# Patient Record
Sex: FEMALE | Race: WHITE | NOT HISPANIC OR LATINO | Employment: FULL TIME | URBAN - METROPOLITAN AREA
[De-identification: names, ages, dates, MRNs, and addresses within clinical notes are randomized per-mention and may not be internally consistent; named-entity substitution may affect disease eponyms.]

---

## 2017-03-20 ENCOUNTER — HOSPITAL ENCOUNTER (EMERGENCY)
Facility: MEDICAL CENTER | Age: 22
End: 2017-03-20
Attending: EMERGENCY MEDICINE
Payer: MEDICAID

## 2017-03-20 ENCOUNTER — APPOINTMENT (OUTPATIENT)
Dept: RADIOLOGY | Facility: MEDICAL CENTER | Age: 22
End: 2017-03-20
Attending: EMERGENCY MEDICINE
Payer: MEDICAID

## 2017-03-20 VITALS
DIASTOLIC BLOOD PRESSURE: 71 MMHG | BODY MASS INDEX: 24.24 KG/M2 | RESPIRATION RATE: 16 BRPM | TEMPERATURE: 98.1 F | HEIGHT: 60 IN | WEIGHT: 123.46 LBS | SYSTOLIC BLOOD PRESSURE: 105 MMHG | OXYGEN SATURATION: 96 % | HEART RATE: 68 BPM

## 2017-03-20 DIAGNOSIS — N93.9 VAGINA BLEEDING: Chronic | ICD-10-CM

## 2017-03-20 DIAGNOSIS — R42 DIZZINESS: ICD-10-CM

## 2017-03-20 DIAGNOSIS — Z91.199 NONCOMPLIANCE WITH TREATMENT PLAN: ICD-10-CM

## 2017-03-20 LAB
ALBUMIN SERPL BCP-MCNC: 5 G/DL (ref 3.2–4.9)
ALBUMIN/GLOB SERPL: 1.9 G/DL
ALP SERPL-CCNC: 66 U/L (ref 30–99)
ALT SERPL-CCNC: 10 U/L (ref 2–50)
ANION GAP SERPL CALC-SCNC: 7 MMOL/L (ref 0–11.9)
APPEARANCE UR: CLEAR
AST SERPL-CCNC: 16 U/L (ref 12–45)
BASOPHILS # BLD AUTO: 0.5 % (ref 0–1.8)
BASOPHILS # BLD: 0.04 K/UL (ref 0–0.12)
BILIRUB SERPL-MCNC: 0.4 MG/DL (ref 0.1–1.5)
BUN SERPL-MCNC: 7 MG/DL (ref 8–22)
CALCIUM SERPL-MCNC: 9.9 MG/DL (ref 8.5–10.5)
CHLORIDE SERPL-SCNC: 106 MMOL/L (ref 96–112)
CO2 SERPL-SCNC: 26 MMOL/L (ref 20–33)
COLOR UR AUTO: YELLOW
CREAT SERPL-MCNC: 0.88 MG/DL (ref 0.5–1.4)
EOSINOPHIL # BLD AUTO: 0.22 K/UL (ref 0–0.51)
EOSINOPHIL NFR BLD: 2.9 % (ref 0–6.9)
ERYTHROCYTE [DISTWIDTH] IN BLOOD BY AUTOMATED COUNT: 39.8 FL (ref 35.9–50)
GFR SERPL CREATININE-BSD FRML MDRD: >60 ML/MIN/1.73 M 2
GLOBULIN SER CALC-MCNC: 2.6 G/DL (ref 1.9–3.5)
GLUCOSE SERPL-MCNC: 63 MG/DL (ref 65–99)
GLUCOSE UR QL STRIP.AUTO: NEGATIVE MG/DL
HCG SERPL QL: NEGATIVE
HCG UR QL: NEGATIVE
HCT VFR BLD AUTO: 49 % (ref 37–47)
HGB BLD-MCNC: 16.2 G/DL (ref 12–16)
IMM GRANULOCYTES # BLD AUTO: 0.02 K/UL (ref 0–0.11)
IMM GRANULOCYTES NFR BLD AUTO: 0.3 % (ref 0–0.9)
KETONES UR QL STRIP.AUTO: NEGATIVE MG/DL
LEUKOCYTE ESTERASE UR QL STRIP.AUTO: ABNORMAL
LYMPHOCYTES # BLD AUTO: 2.93 K/UL (ref 1–4.8)
LYMPHOCYTES NFR BLD: 38.5 % (ref 22–41)
MCH RBC QN AUTO: 28.5 PG (ref 27–33)
MCHC RBC AUTO-ENTMCNC: 33.1 G/DL (ref 33.6–35)
MCV RBC AUTO: 86.3 FL (ref 81.4–97.8)
MONOCYTES # BLD AUTO: 0.45 K/UL (ref 0–0.85)
MONOCYTES NFR BLD AUTO: 5.9 % (ref 0–13.4)
NEUTROPHILS # BLD AUTO: 3.95 K/UL (ref 2–7.15)
NEUTROPHILS NFR BLD: 51.9 % (ref 44–72)
NITRITE UR QL STRIP.AUTO: NEGATIVE
NRBC # BLD AUTO: 0 K/UL
NRBC BLD AUTO-RTO: 0 /100 WBC
PH UR STRIP.AUTO: 6 [PH]
PLATELET # BLD AUTO: 280 K/UL (ref 164–446)
PMV BLD AUTO: 11 FL (ref 9–12.9)
POTASSIUM SERPL-SCNC: 3.6 MMOL/L (ref 3.6–5.5)
PROT SERPL-MCNC: 7.6 G/DL (ref 6–8.2)
PROT UR QL STRIP: NEGATIVE MG/DL
RBC # BLD AUTO: 5.68 M/UL (ref 4.2–5.4)
RBC UR QL AUTO: ABNORMAL
SODIUM SERPL-SCNC: 139 MMOL/L (ref 135–145)
SP GR UR: 1.01
WBC # BLD AUTO: 7.6 K/UL (ref 4.8–10.8)

## 2017-03-20 PROCEDURE — 84703 CHORIONIC GONADOTROPIN ASSAY: CPT | Mod: EDC

## 2017-03-20 PROCEDURE — 99284 EMERGENCY DEPT VISIT MOD MDM: CPT | Mod: EDC

## 2017-03-20 PROCEDURE — 81002 URINALYSIS NONAUTO W/O SCOPE: CPT | Mod: EDC

## 2017-03-20 PROCEDURE — 76830 TRANSVAGINAL US NON-OB: CPT

## 2017-03-20 PROCEDURE — 81025 URINE PREGNANCY TEST: CPT | Mod: EDC

## 2017-03-20 PROCEDURE — 85025 COMPLETE CBC W/AUTO DIFF WBC: CPT | Mod: EDC

## 2017-03-20 PROCEDURE — 36415 COLL VENOUS BLD VENIPUNCTURE: CPT | Mod: EDC

## 2017-03-20 PROCEDURE — 80053 COMPREHEN METABOLIC PANEL: CPT | Mod: EDC

## 2017-03-20 ASSESSMENT — PAIN SCALES - GENERAL
PAINLEVEL_OUTOF10: 8
PAINLEVEL_OUTOF10: 6
PAINLEVEL_OUTOF10: 3

## 2017-03-20 NOTE — ED AVS SNAPSHOT
3/20/2017          Mounika Brennan  [address] [place] [id number]    Dear Mounika:    [facility] the [facility] wants to ensure your discharge home is safe and you or your loved ones have had all your questions answered regarding your care after you leave the hospital.    You may receive a telephone call within two days of your discharge.  This call is to make certain you understand your discharge instructions as well as ensure we provided you with the best care possible during your stay with us.     The call will only last approximately 3-5 minutes and will be done by a nurse.    Once again, we want to ensure your discharge home is safe and that you have a clear understanding of any next steps in your care.  If you have any questions or concerns, please do not hesitate to contact us, we are here for you.  Thank you for choosing [facility] for your healthcare needs.    Sincerely,    Narendra Alves    [facility]

## 2017-03-20 NOTE — ED NOTES
Amb to triage w/ c/o vaginal bleeding, abd cramping & dizziness.  Pt reports symptoms x 7 months after having a vaginal birth.  States dark blood.  8-10 tampons per day.  PWD.

## 2017-03-20 NOTE — ED PROVIDER NOTES
ED Provider Note    Scribed for Emely Beckford M.D. by John Jain. 3/20/2017, 3:45 PM.    Primary care provider: None  Means of arrival: Walk-in  History obtained from: Patient  History limited by: None    CHIEF COMPLAINT  Chief Complaint   Patient presents with   • Vaginal Bleeding   • Dizziness       HPI  Mounika Brennan is a 21 y.o. female who presents to the Emergency Department with vaginal bleeding onset 7 months ago. The patient had her first vaginal delivery of her son 7 months ago and states she began developing vaginal bleeding immediately since deliver. She describes the bleeding as dark red in color with intermittent clots and has been using 10-15 tampons daily that are soaked when changing them. Patient states she began developing intermittent abdominal cramping, loss of appetite, diarrhea with blood present, nausea, and vomiting 2 months later and has been medicating with Ibuprofen and Advil with minimal relief. Patient states that symptoms occasionally keep her up at night and will wake her up from sleep. She notes that having sexual intercourse will slow down the vaginal bleeding, to where she uses 7 tampons, but usually lasts for 2 days before she experiences heavy bleeding again. Patient reports dizziness and bilateral lower extremity numbness and tingling lasting approximately 30 minutes that occurs 3-4 times weekly since the vaginal bleeding began, but denies any tingling at this time. Since the delivery, she reports that she is now able to smell her body odor and notes weight loss of approximately 10 lbs. The patient was seen in a Hospital in [place] for symptoms, where she initially lived, and was advised she follow up with an OB/GYN. However, she states she was unable to find an OB/GYN and moved to [place] 1.5 months ago without established PCP or OB/GYN due to insurance complications. She denies any vaginal discharge, vision changes, syncope, diarrhea, or fever.  The patient reports no  complications during her pregnancy, but states she developed an internal laceration when delivering her baby and had 1 stitch placed. She notes her placenta was removed and did not have any ultrasounds performed after he delivery. She initially breast fed her son for 13 days after birth, but is now formula feeding her son due to nipple complications. The patient states she began having normal menses at the age of 11 years old and began having intercourse at the age of 13. Her last pap smear was last year, which was negative. The patient has no family history of bleeding disorders, ovarian cancer, or endometriosis. 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 She does not take any current medications and is not taking any contraceptives. She was initially on Depo vera after her delivery, but it wore off in December 2016. The patient smokes a pack daily since age 13 and states she smoked throughout the entire pregnancy. No history of asthma or diabetes. She has no allergy to medications.    REVIEW OF SYSTEMS  Pertinent positives include vaginal bleeding, intermittent abdominal cramping, loss of appetite, diarrhea with blood present, nausea, vomiting, dizziness, bilateral lower extremity numbness and tingling, increased body odor, and weight loss. Pertinent negatives include no vaginal discharge, vision changes, syncope, diarrhea, or fever. As above, all other systems reviewed and are negative. C.   See HPI for further details.     PAST MEDICAL HISTORY  3 kidneys.     SURGICAL HISTORY  Kidney surgery.     SOCIAL HISTORY  Social History   Substance Use Topics   • Smoking status: Current Every Day Smoker- 1pk/day     Types: Cigarettes   • Smokeless tobacco: None   • Alcohol Use: No      History   Drug Use No     FAMILY HISTORY  History reviewed. No pertinent family history.    CURRENT MEDICATIONS  Home Medications     Reviewed by Dung Lee R.N.  (Registered Nurse) on 03/20/17 at 1408  Med List Status: Not Addressed    Medication Last Dose Status          Patient Zac Taking any Medications                        ALLERGIES  No Known Allergies    PHYSICAL EXAM  VITAL SIGNS: /66 mmHg  Pulse 65  Temp(Src) 36.4 °C (97.5 °F) (Temporal)  Resp 16  Ht 1.524 m (5')  Wt 56 kg (123 lb 7.3 oz)  BMI 24.11 kg/m2  SpO2 98%  Vitals reviewed.    Consitutional: Well-developed, well-nourished. Negative for: distress.  HENT: Normocephalic, right external ear normal, left external ear normal, oropharynx clear and moist.  Eyes: Conjunctivae normal, extraocular movements normal. Normal pallor of eyelids. Negative for: discharge in right and left eye, icterus.  Neck: Range of motion normal, supple. Negative for cervical adenopathy.  Cardiovascular: Normal rate, regular rhythm, heart sounds normal, intact distal pulses. Negative for: murmur, rub, gallop. Normal capillary refill.   Pulmonary/Chest Wall: Effort normal, breath sounds normal. Negative for: respiratory distress, wheezes, rales, rhonchi.   Abdominal: Soft, bowel sounds normal. tender to palpation in bilateral lower quadrants down to pelvis and hips, Negative for: distention, rebound, guarding.  Musculoskeletal: Normal range of motion. Negative for edema.Normal pedal pulses.   Neurological: Alert and oriented x3. No focal deficits. Normal gait without ataxia  Skin: Warm, dry. Negative for rash.  Psych: Mood/affect normal, behavior normal, judgment normal.    DIAGNOSTIC STUDIES / PROCEDURES    LABS  Results for orders placed or performed during the hospital encounter of 03/20/17   CBC WITH DIFFERENTIAL   Result Value Ref Range    WBC 7.6 4.8 - 10.8 K/uL    RBC 5.68 (H) 4.20 - 5.40 M/uL    Hemoglobin 16.2 (H) 12.0 - 16.0 g/dL    Hematocrit 49.0 (H) 37.0 - 47.0 %    MCV 86.3 81.4 - 97.8 fL    MCH 28.5 27.0 - 33.0 pg    MCHC 33.1 (L) 33.6 - 35.0 g/dL    RDW 39.8 35.9 - 50.0 fL    Platelet Count 280 164 - 446 K/uL     MPV 11.0 9.0 - 12.9 fL    Neutrophils-Polys 51.90 44.00 - 72.00 %    Lymphocytes 38.50 22.00 - 41.00 %    Monocytes 5.90 0.00 - 13.40 %    Eosinophils 2.90 0.00 - 6.90 %    Basophils 0.50 0.00 - 1.80 %    Immature Granulocytes 0.30 0.00 - 0.90 %    Nucleated RBC 0.00 /100 WBC    Neutrophils (Absolute) 3.95 2.00 - 7.15 K/uL    Lymphs (Absolute) 2.93 1.00 - 4.80 K/uL    Monos (Absolute) 0.45 0.00 - 0.85 K/uL    Eos (Absolute) 0.22 0.00 - 0.51 K/uL    Baso (Absolute) 0.04 0.00 - 0.12 K/uL    Immature Granulocytes (abs) 0.02 0.00 - 0.11 K/uL    NRBC (Absolute) 0.00 K/uL   COMP METABOLIC PANEL   Result Value Ref Range    Sodium 139 135 - 145 mmol/L    Potassium 3.6 3.6 - 5.5 mmol/L    Chloride 106 96 - 112 mmol/L    Co2 26 20 - 33 mmol/L    Anion Gap 7.0 0.0 - 11.9    Glucose 63 (L) 65 - 99 mg/dL    Bun 7 (L) 8 - 22 mg/dL    Creatinine 0.88 0.50 - 1.40 mg/dL    Calcium 9.9 8.5 - 10.5 mg/dL    AST(SGOT) 16 12 - 45 U/L    ALT(SGPT) 10 2 - 50 U/L    Alkaline Phosphatase 66 30 - 99 U/L    Total Bilirubin 0.4 0.1 - 1.5 mg/dL    Albumin 5.0 (H) 3.2 - 4.9 g/dL    Total Protein 7.6 6.0 - 8.2 g/dL    Globulin 2.6 1.9 - 3.5 g/dL    A-G Ratio 1.9 g/dL   HCG QUAL SERUM   Result Value Ref Range    Beta-Hcg Qualitative Serum Negative Negative   ESTIMATED GFR   Result Value Ref Range    GFR If African American >60 >60 mL/min/1.73 m 2    GFR If Non African American >60 >60 mL/min/1.73 m 2   POC UA   Result Value Ref Range    POC Color Yellow     POC Appearance Clear     POC Glucose Negative Negative mg/dL    POC Ketones Negative Negative mg/dL    POC Specific Gravity 1.010 1.005-1.030    POC Blood Trace-lysed (A) Negative    POC Urine PH 6.0 5.0-8.0    POC Protein Negative Negative mg/dL    POC Nitrites Negative Negative    POC Leukocyte Esterase Trace (A) Negative   POC URINE PREGNANCY   Result Value Ref Range    POC Urine Pregnancy Test Negative Negative   All labs reviewed by me.    RADIOLOGY  US-GYN-PELVIS TRANSVAGINAL   Final  Result      Endometrial stripe measuring 1 mm width with no evidence of retained products of conception.      Scattered small echogenic foci throughout the medial cavity likely representing air from recent delivery      The radiologist's interpretation of all radiological studies have been reviewed by me.    COURSE & MEDICAL DECISION MAKING  Nursing notes, VS, PMSFHx reviewed in chart.      3:07 PM Ordered estimated GFR, CMP, CBC, POC urine pregnancy, and HCG qual serum.     4:03 PM Patient seen at bedside by medical student. The patient presents with chronic postpartum vaginal bleeding without OB follow-up and the differential diagnosis includes but is not limited to anemia, retained placenta, endometritis. Ordered US-OB pelvis transvaginal.    6:24 PM I've advised the patient that it is imperative to see a OB for her ongoing postpartum issues. In the meantime, I have assured her that she does not appear to be dehydrated or anemic and is handling whatever blood loss she has well.    The patient will return for new or worsening symptoms and is stable at the time of discharge.    The patient is referred to a primary physician for blood pressure management, diabetic screening, and for all other preventative health concerns.    DISPOSITION:  Patient will be discharged home in stable condition.    FOLLOW UP:  Makenzie Parry M.D.  [address]  [address]  [facility] [address]  [phone]    Call in 1 day  for further evaluation      OUTPATIENT MEDICATIONS:  New Prescriptions    No medications on file           FINAL IMPRESSION  1. Vagina bleeding    2. Dizziness    3. Noncompliance with treatment plan          John CHEN), am scribing for, and in the presence of, Emely Beckford M.D..    Electronically signed by: John Butt), 3/20/2017    Emely CHEN M.D. personally performed the services described in this documentation, as scribed by John Jain in my presence, and it is both accurate and  complete.    The note accurately reflects work and decisions made by me.  Emely Beckford  3/20/2017  6:25 PM

## 2017-03-20 NOTE — ED AVS SNAPSHOT
Home Care Instructions                                                                                                                Mounika Brennan   MRN: [id number]    Department:  [facility], Emergency Dept   Date of Visit:  3/20/2017            [facility], Emergency Dept    [address]    [facility] [address]    Phone:  [phone]      You were seen by     Emely Beckford M.D.      Your Diagnosis Was     Vagina bleeding     N93.9       Follow-up Information     1. Follow up with Makenzie Parry M.D.. Call in 1 day.    Specialty:  OB/Gyn    Why:  for further evaluation    Contact information    [address]  [address]  [facility] [address]  [phone]        Medication Information     Review all of your home medications and newly ordered medications with your primary doctor and/or pharmacist as soon as possible. Follow medication instructions as directed by your doctor and/or pharmacist.     Please keep your complete medication list with you and share with your physician. Update the information when medications are discontinued, doses are changed, or new medications (including over-the-counter products) are added; and carry medication information at all times in the event of emergency situations.               Medication List      Notice     You have not been prescribed any medications.            Procedures and tests performed during your visit     CARDIAC MONITORING    CBC WITH DIFFERENTIAL    COMP METABOLIC PANEL    ESTIMATED GFR    HCG QUAL SERUM    O2 Protocol    POC UA    POC URINE PREGNANCY    SALINE LOCK    US-GYN-PELVIS TRANSVAGINAL        Discharge Instructions       Abnormal Uterine Bleeding  Abnormal uterine bleeding means bleeding from the vagina that is not your normal menstrual period. This can be:  · Bleeding or spotting between periods.  · Bleeding after sex (sexual intercourse).  · Bleeding that is heavier or more than normal.  · Periods that last longer than  usual.  · Bleeding after menopause.  There are many problems that may cause this. Treatment will depend on the cause of the bleeding. Any kind of bleeding that is not normal should be reviewed by your doctor.   HOME CARE  Watch your condition for any changes. These actions may lessen any discomfort you are having:  · Do not use tampons or douches as told by your doctor.  · Change your pads often.  You should get regular pelvic exams and Pap tests. Keep all appointments for tests as told by your doctor.  GET HELP IF:  · You are bleeding for more than 1 week.  · You feel dizzy at times.  GET HELP RIGHT AWAY IF:   · You pass out.  · You have to change pads every 15 to 30 minutes.  · You have belly pain.  · You have a fever.  · You become sweaty or weak.  · You are passing large blood clots from the vagina.  · You feel sick to your stomach (nauseous) and throw up (vomit).  MAKE SURE YOU:  · Understand these instructions.  · Will watch your condition.  · Will get help right away if you are not doing well or get worse.     This information is not intended to replace advice given to you by your health care provider. Make sure you discuss any questions you have with your health care provider.     Document Released: 10/15/2010 Document Revised: 12/23/2014 Document Reviewed: 07/17/2014  [employer]vier Interactive Patient Education ©2016 [email or web address]            Patient Information     Patient Information    Following emergency treatment: all patient requiring follow-up care must return either to a private physician or a clinic if your condition worsens before you are able to obtain further medical attention, please return to the emergency room.     Billing Information    At [facility], we work to make the billing process streamlined for our patients.  Our Representatives are here to answer any questions you may have regarding your hospital bill.  If you have insurance coverage and have supplied your insurance information to us,  we will submit a claim to your insurer on your behalf.  Should you have any questions regarding your bill, we can be reached online or by phone as follows:  Online: You are able pay your bills online or live chat with our representatives about any billing questions you may have. We are here to help Monday - Friday from 8:00am to 7:30pm and 9:00am - 12:00pm on Saturdays.  Please visit [email or web address]  for more information.   Phone:  [phone] or [phone]    Please note that your emergency physician, surgeon, pathologist, radiologist, anesthesiologist, and other specialists are not employed by [facility] and will therefore bill separately for their services.  Please contact them directly for any questions concerning their bills at the numbers below:     Emergency Physician Services:  [phone]  [facility]:  [phone]  [facility]:  [phone]  [facility]:  [phone]    1. Your final bill may vary from the amount quoted upon discharge if all procedures are not complete at that time, or if your doctor has additional procedures of which we are not aware. You will receive an additional bill if you return to the Emergency Department at [facility] for suture removal regardless of the facility of which the sutures were placed.     2. Please arrange for settlement of this account at the emergency registration.    3. All self-pay accounts are due in full at the time of treatment.  If you are unable to meet this obligation then payment is expected within 4-5 days.     4. If you have had radiology studies (CT, X-ray, Ultrasound, MRI), you have received a preliminary result during your emergency department visit. Please contact the radiology department [phone] to receive a copy of your final result. Please discuss the Final result with your primary physician or with the follow up physician provided.     Crisis  Hotline:  National Crisis Hotline:  5-930-LDEJRLE or 1-975.425.5424  [place] Crisis Hotline:    1-991.401.7875 or 168-698-7526         ED Discharge Follow Up Questions    1. In order to provide you with very good care, we would like to follow up with a phone call in the next few days.  May we have your permission to contact you?     YES /  NO    2. What is the best phone number to call you? (       )_____-__________    3. What is the best time to call you?      Morning  /  Afternoon  /  Evening                   Patient Signature:  ____________________________________________________________    Date:  ____________________________________________________________

## 2017-03-20 NOTE — ED AVS SNAPSHOT
Universal Devices Access Code: [email or web address]  Expires: 4/19/2017  6:23 PM    Universal Devices  A secure, online tool to manage your health information     [employer]’s Universal Devices® is a secure, online tool that connects you to your personalized health information from the privacy of your home -- day or night - making it very easy for you to manage your healthcare. Once the activation process is completed, you can even access your medical information using the Universal Devices radha, which is available for free in the Apple Radha store or Google Play store.     Universal Devices provides the following levels of access (as shown below):   My Chart Features   [facility] Primary Care Doctor [facility]  Specialists [facility]  Urgent  Care Non-[facility]  Primary Care  Doctor   Email your healthcare team securely and privately 24/7 X X X X   Manage appointments: schedule your next appointment; view details of past/upcoming appointments X      Request prescription refills. X      View recent personal medical records, including lab and immunizations X X X X   View health record, including health history, allergies, medications X X X X   Read reports about your outpatient visits, procedures, consult and ER notes X X X X   See your discharge summary, which is a recap of your hospital and/or ER visit that includes your diagnosis, lab results, and care plan. X X       How to register for [employer] Devices:  1. Go to  [email or web address].  2. Click on the Sign Up Now box, which takes you to the New Member Sign Up page. You will need to provide the following information:  a. Enter your Universal Devices Access Code exactly as it appears at the top of this page. (You will not need to use this code after you’ve completed the sign-up process. If you do not sign up before the expiration date, you must request a new code.)   b. Enter your date of birth.   c. Enter your home email address.   d. Click Submit, and follow the next screen’s instructions.  3. Create a Universal Devices ID. This will be your Universal Devices  login ID and cannot be changed, so think of one that is secure and easy to remember.  4. Create a [employer] password. You can change your password at any time.  5. Enter your Password Reset Question and Answer. This can be used at a later time if you forget your password.   6. Enter your e-mail address. This allows you to receive e-mail notifications when new information is available in [employer].  7. Click Sign Up. You can now view your health information.    For assistance activating your [employer] account, call [phone]

## 2017-03-21 NOTE — DISCHARGE INSTRUCTIONS
Abnormal Uterine Bleeding  Abnormal uterine bleeding means bleeding from the vagina that is not your normal menstrual period. This can be:  · Bleeding or spotting between periods.  · Bleeding after sex (sexual intercourse).  · Bleeding that is heavier or more than normal.  · Periods that last longer than usual.  · Bleeding after menopause.  There are many problems that may cause this. Treatment will depend on the cause of the bleeding. Any kind of bleeding that is not normal should be reviewed by your doctor.   HOME CARE  Watch your condition for any changes. These actions may lessen any discomfort you are having:  · Do not use tampons or douches as told by your doctor.  · Change your pads often.  You should get regular pelvic exams and Pap tests. Keep all appointments for tests as told by your doctor.  GET HELP IF:  · You are bleeding for more than 1 week.  · You feel dizzy at times.  GET HELP RIGHT AWAY IF:   · You pass out.  · You have to change pads every 15 to 30 minutes.  · You have belly pain.  · You have a fever.  · You become sweaty or weak.  · You are passing large blood clots from the vagina.  · You feel sick to your stomach (nauseous) and throw up (vomit).  MAKE SURE YOU:  · Understand these instructions.  · Will watch your condition.  · Will get help right away if you are not doing well or get worse.     This information is not intended to replace advice given to you by your health care provider. Make sure you discuss any questions you have with your health care provider.     Document Released: 10/15/2010 Document Revised: 12/23/2014 Document Reviewed: 07/17/2014  [employer] Interactive Patient Education ©2016 [employer]

## 2017-03-21 NOTE — ED NOTES
Pt discharged, reviewed all discharge instructions and follow up, denies questions and complaints.  Escorted to lobby.

## 2017-04-27 ENCOUNTER — HOSPITAL ENCOUNTER (EMERGENCY)
Facility: MEDICAL CENTER | Age: 22
End: 2017-04-27
Attending: EMERGENCY MEDICINE
Payer: MEDICAID

## 2017-04-27 VITALS
DIASTOLIC BLOOD PRESSURE: 63 MMHG | BODY MASS INDEX: 23.5 KG/M2 | HEIGHT: 60 IN | OXYGEN SATURATION: 99 % | WEIGHT: 119.71 LBS | RESPIRATION RATE: 18 BRPM | TEMPERATURE: 97.4 F | SYSTOLIC BLOOD PRESSURE: 103 MMHG | HEART RATE: 63 BPM

## 2017-04-27 DIAGNOSIS — N93.8 DUB (DYSFUNCTIONAL UTERINE BLEEDING): ICD-10-CM

## 2017-04-27 DIAGNOSIS — A59.01 TRICHOMONAL VAGINITIS: ICD-10-CM

## 2017-04-27 LAB
ALBUMIN SERPL BCP-MCNC: 4.6 G/DL (ref 3.2–4.9)
ALBUMIN/GLOB SERPL: 2 G/DL
ALP SERPL-CCNC: 70 U/L (ref 30–99)
ALT SERPL-CCNC: 8 U/L (ref 2–50)
ANION GAP SERPL CALC-SCNC: 5 MMOL/L (ref 0–11.9)
AST SERPL-CCNC: 15 U/L (ref 12–45)
BACTERIA GENITAL QL WET PREP: NORMAL
BASOPHILS # BLD AUTO: 0.5 % (ref 0–1.8)
BASOPHILS # BLD: 0.03 K/UL (ref 0–0.12)
BILIRUB SERPL-MCNC: 0.5 MG/DL (ref 0.1–1.5)
BUN SERPL-MCNC: 10 MG/DL (ref 8–22)
C TRACH DNA SPEC QL NAA+PROBE: NEGATIVE
CALCIUM SERPL-MCNC: 9.3 MG/DL (ref 8.5–10.5)
CHLORIDE SERPL-SCNC: 109 MMOL/L (ref 96–112)
CO2 SERPL-SCNC: 23 MMOL/L (ref 20–33)
CREAT SERPL-MCNC: 0.71 MG/DL (ref 0.5–1.4)
EOSINOPHIL # BLD AUTO: 0.17 K/UL (ref 0–0.51)
EOSINOPHIL NFR BLD: 2.9 % (ref 0–6.9)
ERYTHROCYTE [DISTWIDTH] IN BLOOD BY AUTOMATED COUNT: 38.8 FL (ref 35.9–50)
GFR SERPL CREATININE-BSD FRML MDRD: >60 ML/MIN/1.73 M 2
GLOBULIN SER CALC-MCNC: 2.3 G/DL (ref 1.9–3.5)
GLUCOSE SERPL-MCNC: 95 MG/DL (ref 65–99)
HCG SERPL QL: NEGATIVE
HCT VFR BLD AUTO: 44.3 % (ref 37–47)
HGB BLD-MCNC: 15 G/DL (ref 12–16)
IMM GRANULOCYTES # BLD AUTO: 0.02 K/UL (ref 0–0.11)
IMM GRANULOCYTES NFR BLD AUTO: 0.3 % (ref 0–0.9)
LYMPHOCYTES # BLD AUTO: 2.15 K/UL (ref 1–4.8)
LYMPHOCYTES NFR BLD: 36.9 % (ref 22–41)
MCH RBC QN AUTO: 28.9 PG (ref 27–33)
MCHC RBC AUTO-ENTMCNC: 33.9 G/DL (ref 33.6–35)
MCV RBC AUTO: 85.4 FL (ref 81.4–97.8)
MONOCYTES # BLD AUTO: 0.3 K/UL (ref 0–0.85)
MONOCYTES NFR BLD AUTO: 5.2 % (ref 0–13.4)
N GONORRHOEA DNA SPEC QL NAA+PROBE: NEGATIVE
NEUTROPHILS # BLD AUTO: 3.15 K/UL (ref 2–7.15)
NEUTROPHILS NFR BLD: 54.2 % (ref 44–72)
NRBC # BLD AUTO: 0 K/UL
NRBC BLD AUTO-RTO: 0 /100 WBC
PLATELET # BLD AUTO: 268 K/UL (ref 164–446)
PMV BLD AUTO: 11.4 FL (ref 9–12.9)
POTASSIUM SERPL-SCNC: 3.9 MMOL/L (ref 3.6–5.5)
PROT SERPL-MCNC: 6.9 G/DL (ref 6–8.2)
RBC # BLD AUTO: 5.19 M/UL (ref 4.2–5.4)
SIGNIFICANT IND 70042: NORMAL
SITE SITE: NORMAL
SODIUM SERPL-SCNC: 137 MMOL/L (ref 135–145)
SOURCE SOURCE: NORMAL
SPECIMEN SOURCE: NORMAL
T4 FREE SERPL-MCNC: 0.67 NG/DL (ref 0.53–1.43)
TSH SERPL DL<=0.005 MIU/L-ACNC: 0.88 UIU/ML (ref 0.3–3.7)
WBC # BLD AUTO: 5.8 K/UL (ref 4.8–10.8)

## 2017-04-27 PROCEDURE — 84443 ASSAY THYROID STIM HORMONE: CPT

## 2017-04-27 PROCEDURE — 80053 COMPREHEN METABOLIC PANEL: CPT

## 2017-04-27 PROCEDURE — 85025 COMPLETE CBC W/AUTO DIFF WBC: CPT

## 2017-04-27 PROCEDURE — 96374 THER/PROPH/DIAG INJ IV PUSH: CPT

## 2017-04-27 PROCEDURE — 700111 HCHG RX REV CODE 636 W/ 250 OVERRIDE (IP): Performed by: EMERGENCY MEDICINE

## 2017-04-27 PROCEDURE — 87591 N.GONORRHOEAE DNA AMP PROB: CPT

## 2017-04-27 PROCEDURE — 99285 EMERGENCY DEPT VISIT HI MDM: CPT

## 2017-04-27 PROCEDURE — 36415 COLL VENOUS BLD VENIPUNCTURE: CPT

## 2017-04-27 PROCEDURE — A9270 NON-COVERED ITEM OR SERVICE: HCPCS | Performed by: EMERGENCY MEDICINE

## 2017-04-27 PROCEDURE — 700102 HCHG RX REV CODE 250 W/ 637 OVERRIDE(OP): Performed by: EMERGENCY MEDICINE

## 2017-04-27 PROCEDURE — 87491 CHLMYD TRACH DNA AMP PROBE: CPT

## 2017-04-27 PROCEDURE — 84439 ASSAY OF FREE THYROXINE: CPT

## 2017-04-27 PROCEDURE — 84703 CHORIONIC GONADOTROPIN ASSAY: CPT

## 2017-04-27 RX ORDER — METRONIDAZOLE 500 MG/1
2000 TABLET ORAL ONCE
Status: COMPLETED | OUTPATIENT
Start: 2017-04-27 | End: 2017-04-27

## 2017-04-27 RX ORDER — ONDANSETRON 4 MG/1
4 TABLET, ORALLY DISINTEGRATING ORAL ONCE
Status: COMPLETED | OUTPATIENT
Start: 2017-04-27 | End: 2017-04-27

## 2017-04-27 RX ORDER — NORGESTIMATE AND ETHINYL ESTRADIOL 0.25-0.035
1 KIT ORAL DAILY
Qty: 28 TAB | Refills: 12 | Status: SHIPPED | OUTPATIENT
Start: 2017-04-27

## 2017-04-27 RX ORDER — KETOROLAC TROMETHAMINE 30 MG/ML
30 INJECTION, SOLUTION INTRAMUSCULAR; INTRAVENOUS ONCE
Status: COMPLETED | OUTPATIENT
Start: 2017-04-27 | End: 2017-04-27

## 2017-04-27 RX ORDER — NAPROXEN 500 MG/1
500 TABLET ORAL 2 TIMES DAILY WITH MEALS
Qty: 60 TAB | Refills: 0 | Status: SHIPPED | OUTPATIENT
Start: 2017-04-27

## 2017-04-27 RX ADMIN — KETOROLAC TROMETHAMINE 30 MG: 30 INJECTION, SOLUTION INTRAMUSCULAR; INTRAVENOUS at 10:27

## 2017-04-27 RX ADMIN — METRONIDAZOLE 2000 MG: 500 TABLET ORAL at 12:23

## 2017-04-27 RX ADMIN — ONDANSETRON 4 MG: 4 TABLET, ORALLY DISINTEGRATING ORAL at 12:23

## 2017-04-27 NOTE — ED PROVIDER NOTES
"ED Provider Note    Scribed for Shagufta Aranda M.D. by Alvarado Patten. 4/27/2017  10:03 AM    Primary care provider: Pcp Pt States None  Means of arrival: Walk-in  History obtained from: Patient  History limited by: None    CHIEF COMPLAINT  Chief Complaint   Patient presents with   • Vaginal Bleeding       HPI  Mounika Brennan is a 21 y.o. female who presents to the Emergency Department for evaluation of constant vaginal bleeding that has been going on for 8 months.  Patient delivered her first child 8 months ago and has been having vaginal bleeding ever since. Patient states she is going through \"12-15 tampons\" daily and reports she is clotting a lot. She has severe abdominal cramping and pain as well.  Patient was seen at [facility] ED a month ago and received a full workup then.  Her labs and GYN ultrasound were all normal.  Patient was advised to follow up care with Dr. Parry but denies seeking this care secondary to Dr. Parry not accepting her Medicaid insurance.  She returned to the ED today secondary to her abdominal cramping and vaginal bleeding increasing in intensity. Patient denies being on any type of hormones at this time. She is not experiencing any fevers, vomiting, or diarrhea.     REVIEW OF SYSTEMS  HEENT:  No ear pain, congestion, or sore throat   EYES: no discharge, redness, or vision changes  CARDIAC: no chest pain, no palpitations    PULMONARY: no dyspnea, cough, or congestion   GI: abdominal cramping, no vomiting, diarrhea  : vaginal bleeding and cramping, no dysuria, back pain, or hematuria   Neuro: no weakness, numbness, aphasia, or headache  Musculoskeletal: no swelling, deformity, pain, or joint swelling  Endocrine: no fevers, sweating, or weight loss   SKIN: no rash, erythema, or contusions     See history of present illness. All other systems are negative    PAST MEDICAL HISTORY  No pertinent family history    SURGICAL HISTORY  patient denies any surgical history    SOCIAL " HISTORY  Social History   Substance Use Topics   • Smoking status: Current Every Day Smoker     Types: Cigarettes   • Alcohol Use: No      History   Drug Use No       FAMILY HISTORY  No pertinent family history]    CURRENT MEDICATIONS  No current facility-administered medications on file prior to encounter.     No current outpatient prescriptions on file prior to encounter.       ALLERGIES  No Known Allergies    PHYSICAL EXAM  VITAL SIGNS: /63 mmHg  Pulse 63  Temp(Src) 36.3 °C (97.4 °F)  Resp 18  Ht 1.524 m (5')  Wt 54.3 kg (119 lb 11.4 oz)  BMI 23.38 kg/m2  SpO2 99%    Constitutional: Well developed, Well nourished, No acute distress, Non-toxic appearance.   HEENT: Normocephalic, Atraumatic,  external ears normal, pharynx pink,  Mucous  Membranes moist, No rhinorrhea or mucosal edema  Eyes: PERRL, EOMI, Conjunctiva normal, No discharge.   Neck: Normal range of motion, No tenderness, Supple, No stridor.   Lymphatic: No lymphadenopathy    Cardiovascular: Regular Rate and Rhythm, No murmurs,  rubs, or gallops.   Thorax & Lungs: Lungs clear to auscultation bilaterally, No respiratory distress, No wheezes, rhales or rhonchi, No chest wall tenderness.   Abdomen: Bowel sounds normal, Soft, non tender, non distended,  No pulsatile masses., no rebound guarding or peritoneal signs.  : Small amount of blood in vaginal vault, no clotting. Osce closed. Uterus mildly tender, no palpable masses.   Skin: Warm, Dry, No erythema, No rash,   Back:  No CVA tenderness,  No spinal tenderness, bony crepitance, step offs, or instability.   Neurologic: Alert & oriented x 3, Normal motor function, Normal sensory function, No focal deficits noted. Normal reflexes. Normal Cranial Nerves.  Extremities: Equal, intact distal pulses, No cyanosis, clubbing or edema,  No tenderness.   Musculoskeletal: Good range of motion in all major joints. No tenderness to palpation or major deformities noted.     DIAGNOSTIC STUDIES /  PROCEDURES    LABS  Results for orders placed or performed during the hospital encounter of 04/27/17   CBC WITH DIFFERENTIAL   Result Value Ref Range    WBC 5.8 4.8 - 10.8 K/uL    RBC 5.19 4.20 - 5.40 M/uL    Hemoglobin 15.0 12.0 - 16.0 g/dL    Hematocrit 44.3 37.0 - 47.0 %    MCV 85.4 81.4 - 97.8 fL    MCH 28.9 27.0 - 33.0 pg    MCHC 33.9 33.6 - 35.0 g/dL    RDW 38.8 35.9 - 50.0 fL    Platelet Count 268 164 - 446 K/uL    MPV 11.4 9.0 - 12.9 fL    Neutrophils-Polys 54.20 44.00 - 72.00 %    Lymphocytes 36.90 22.00 - 41.00 %    Monocytes 5.20 0.00 - 13.40 %    Eosinophils 2.90 0.00 - 6.90 %    Basophils 0.50 0.00 - 1.80 %    Immature Granulocytes 0.30 0.00 - 0.90 %    Nucleated RBC 0.00 /100 WBC    Neutrophils (Absolute) 3.15 2.00 - 7.15 K/uL    Lymphs (Absolute) 2.15 1.00 - 4.80 K/uL    Monos (Absolute) 0.30 0.00 - 0.85 K/uL    Eos (Absolute) 0.17 0.00 - 0.51 K/uL    Baso (Absolute) 0.03 0.00 - 0.12 K/uL    Immature Granulocytes (abs) 0.02 0.00 - 0.11 K/uL    NRBC (Absolute) 0.00 K/uL   TSH   Result Value Ref Range    TSH 0.880 0.300 - 3.700 uIU/mL   CMP   Result Value Ref Range    Sodium 137 135 - 145 mmol/L    Potassium 3.9 3.6 - 5.5 mmol/L    Chloride 109 96 - 112 mmol/L    Co2 23 20 - 33 mmol/L    Anion Gap 5.0 0.0 - 11.9    Glucose 95 65 - 99 mg/dL    Bun 10 8 - 22 mg/dL    Creatinine 0.71 0.50 - 1.40 mg/dL    Calcium 9.3 8.5 - 10.5 mg/dL    AST(SGOT) 15 12 - 45 U/L    ALT(SGPT) 8 2 - 50 U/L    Alkaline Phosphatase 70 30 - 99 U/L    Total Bilirubin 0.5 0.1 - 1.5 mg/dL    Albumin 4.6 3.2 - 4.9 g/dL    Total Protein 6.9 6.0 - 8.2 g/dL    Globulin 2.3 1.9 - 3.5 g/dL    A-G Ratio 2.0 g/dL   BETA-HCG QUALITATIVE SERUM   Result Value Ref Range    Beta-Hcg Qualitative Serum Negative Negative   FREE THYROXINE   Result Value Ref Range    Free T-4 0.67 0.53 - 1.43 ng/dL   ESTIMATED GFR   Result Value Ref Range    GFR If African American >60 >60 mL/min/1.73 m 2    GFR If Non African American >60 >60 mL/min/1.73 m 2   WET  PREP   Result Value Ref Range    Significant Indicator NEG     Source GEN     Site VAGINAL     Wet Prep For Parasites       Few motile Trichomonas seen.  No clue cells seen.  No yeast.     CHLAMYDIA & GC BY PCR   Result Value Ref Range    Source Vaginal      All labs reviewed by me.    COURSE & MEDICAL DECISION MAKING  Nursing notes, VS, PMSFHx reviewed in chart.    10:03 AM - Patient seen and examined at bedside. Patient will be treated with 30mg Ketorolac IV and 4mg Zofran IV. Ordered CBC, TSH, Free Thyroxine, CMP, Beta-HCG Qualitative Serum, and pelvic exam to evaluate her symptoms. The differential diagnoses include but are not limited to: anemia, dysfunctional uterine bleeding.     11:04 AM - After reviewing patient's lab, her hemoglobin was 15 and stable.  Her pregnancy test was negative.  Patient's pelvic ultrasound was normal from her visit last month in the ED so I did not think it was necessary to get another ultrasound today.      11:47 AM - I informed patient her labs indicated she has trigamous. Patient will be treated with a prescription for 2000mg Flagyl.    11:02AM - I paged Dr. Sutherland (GYN) for a consult in hopes that patient can seek follow up care with him. I wanted to make sure he would accept patient's Medicaid insurance.     12:32 PM  - I discussed the patient's case and the above findings with Dr. Sutherland (GYN) who agreed to see patient in his office for follow up care.  The soonest he can get patient in for an appointment is in July.  He informed me to discharge patient with prescription for 500mg Naprosyn and Sprintec birth control in the meantime to manage patient's vaginal bleeding.     The patient will return for new or worsening symptoms and is stable at the time of discharge.    The patient is referred to a primary physician for blood pressure management, diabetic screening, and for all other preventative health concerns.    DISPOSITION:  Patient will be discharged home in stable  condition.    FOLLOW UP:  Ed Sutherland M.D.  [address]  [place] [address]  [phone]    Call in 1 day  to establish care, for recheck      OUTPATIENT MEDICATIONS:  Discharge Medication List as of 4/27/2017 12:48 PM      START taking these medications    Details   norgestimate-ethinyl estradiol (SPRINTEC 28) 0.25-35 MG-MCG per tablet Take 1 Tab by mouth every day., Disp-28 Tab, R-12, Print Rx Paper      naproxen (NAPROSYN) 500 MG Tab Take 1 Tab by mouth 2 times a day, with meals., Disp-60 Tab, R-0, Print Rx Paper                 FINAL IMPRESSION  1. DUB (dysfunctional uterine bleeding)    2. Trichomonal vaginitis          Alvarado CHEN (Scribe), am scribing for, and in the presence of, Shagufta Aranda M.D..    Electronically signed by: Alvarado Patten (Rakelibe), 4/27/2017    Shagufta CHEN M.D. personally performed the services described in this documentation, as scribed by Alvarado Patten in my presence, and it is both accurate and complete.    The note accurately reflects work and decisions made by me.  Shagufta Aranda  4/27/2017  4:30 PM

## 2017-04-27 NOTE — ED AVS SNAPSHOT
FashFolio Access Code: [email or web address]  Expires: 5/27/2017 12:48 PM    FashFolio  A secure, online tool to manage your health information     [employer]’s FashFolio® is a secure, online tool that connects you to your personalized health information from the privacy of your home -- day or night - making it very easy for you to manage your healthcare. Once the activation process is completed, you can even access your medical information using the FashFolio radha, which is available for free in the Apple Radha store or Google Play store.     FashFolio provides the following levels of access (as shown below):   My Chart Features   [facility] Primary Care Doctor [facility]  Specialists [facility]  Urgent  Care Non-[facility]  Primary Care  Doctor   Email your healthcare team securely and privately 24/7 X X X X   Manage appointments: schedule your next appointment; view details of past/upcoming appointments X      Request prescription refills. X      View recent personal medical records, including lab and immunizations X X X X   View health record, including health history, allergies, medications X X X X   Read reports about your outpatient visits, procedures, consult and ER notes X X X X   See your discharge summary, which is a recap of your hospital and/or ER visit that includes your diagnosis, lab results, and care plan. X X       How to register for FashFolio:  1. Go to  [email or web address].  2. Click on the Sign Up Now box, which takes you to the New Member Sign Up page. You will need to provide the following information:  a. Enter your FashFolio Access Code exactly as it appears at the top of this page. (You will not need to use this code after you’ve completed the sign-up process. If you do not sign up before the expiration date, you must request a new code.)   b. Enter your date of birth.   c. Enter your home email address.   d. Click Submit, and follow the next screen’s instructions.  3. Create a FashFolio ID. This will be your FashFolio  login ID and cannot be changed, so think of one that is secure and easy to remember.  4. Create a [employer] password. You can change your password at any time.  5. Enter your Password Reset Question and Answer. This can be used at a later time if you forget your password.   6. Enter your e-mail address. This allows you to receive e-mail notifications when new information is available in [employer].  7. Click Sign Up. You can now view your health information.    For assistance activating your [employer] account, call [phone]

## 2017-04-27 NOTE — ED NOTES
Discharge instructions given, pt verbalized understanding.   Prescription instructions given, pt verbalized understanding.  A&ox4.  VSS.  Ambulates out of ER.

## 2017-04-27 NOTE — ED AVS SNAPSHOT
4/27/2017    Mounika Brennan  [address] [place] [id number]    Dear Mounika:    [facility] wants to ensure your discharge home is safe and you or your loved ones have had all of your questions answered regarding your care after you leave the hospital.    Below is a list of resources and contact information should you have any questions regarding your hospital stay, follow-up instructions, or active medical symptoms.    Questions or Concerns Regarding… Contact   Medical Questions Related to Your Discharge  (7 days a week, 8am-5pm) Contact a Nurse Care Coordinator   [phone]   Medical Questions Not Related to Your Discharge  (24 hours a day / 7 days a week)  Contact the Nurse Health Line   [phone]    Medications or Discharge Instructions Refer to your discharge packet   or contact your [facility] Primary Care Provider   [phone]   Follow-up Appointment(s) Schedule your appointment via LogicLadder   or contact Scheduling [phone]   Billing Review your statement via [employer]adder  or contact Billing [phone]   Medical Records Review your records via Logic[employer]adder   or contact Medical Records [phone]     You may receive a telephone call within two days of discharge. This call is to make certain you understand your discharge instructions and have the opportunity to have any questions answered. You can also easily access your medical information, test results and upcoming appointments via the LogicLadder free online health management tool. You can learn more and sign up at [email or web address]/LogicLadder. For assistance setting up your LogicLadder account, please call [phone].    Once again, we want to ensure your discharge home is safe and that you have a clear understanding of any next steps in your care. If you have any questions or concerns, please do not hesitate to contact us, we are here for you. Thank you for choosing [facility] for your healthcare needs.    Sincerely,    Your [facility] Healthcare Team

## 2017-04-27 NOTE — ED NOTES
Pt to triage c/o vaginal bleeding with abd pain x 8 months. Pt reports using several tampons a day. Denies pregnancy.   Pt advised to return to triage nurse for any changes or concerns.

## 2017-04-27 NOTE — ED AVS SNAPSHOT
Home Care Instructions                                                                                                                Mounika Brennan   MRN: [id number]    Department:  [facility], Emergency Dept   Date of Visit:  4/27/2017            [facility], Emergency Dept    [address] [address]    Phone:  [phone]      You were seen by     Shagufta Aranda M.D.      Your Diagnosis Was     DUB (dysfunctional uterine bleeding)     N93.8       These are the medications you received during your hospitalization from 04/27/2017 0929 to 04/27/2017 1248     Date/Time Order Dose Route Action    04/27/2017 1027 ketorolac (TORADOL) 30 mg injection 30 mg Intravenous Given    04/27/2017 1223 metronidazole (FLAGYL) tablet 2,000 mg 2,000 mg Oral Given    04/27/2017 1223 ondansetron (ZOFRAN ODT) dispertab 4 mg 4 mg Oral Given      Follow-up Information     1. Follow up with Ed Sutherland M.D.. Call in 1 day.    Specialty:  OB/Gyn    Why:  to establish care, for recheck    Contact information    [facility]  [facility] [address] [phone]        Medication Information     Review all of your home medications and newly ordered medications with your primary doctor and/or pharmacist as soon as possible. Follow medication instructions as directed by your doctor and/or pharmacist.     Please keep your complete medication list with you and share with your physician. Update the information when medications are discontinued, doses are changed, or new medications (including over-the-counter products) are added; and carry medication information at all times in the event of emergency situations.               Medication List      START taking these medications        Instructions    Morning Afternoon Evening Bedtime    naproxen 500 MG Tabs   Commonly known as:  NAPROSYN        Take 1 Tab by mouth 2 times a day, with meals.   Dose:  500 mg                        norgestimate-ethinyl estradiol 0.25-35 MG-MCG per tablet   Commonly known as:  SPRINTEC 28        Take 1 Tab by mouth every day.   Dose:  1 Tab                             Where to Get Your Medications      You can get these medications from any pharmacy     Bring a paper prescription for each of these medications    - naproxen 500 MG Tabs  - norgestimate-ethinyl estradiol 0.25-35 MG-MCG per tablet            Procedures and tests performed during your visit     BETA-HCG QUALITATIVE SERUM    CBC WITH DIFFERENTIAL    CHLAMYDIA & GC BY PCR    CMP    ESTIMATED GFR    FREE THYROXINE    SALINE LOCK    TSH    WET PREP        Discharge Instructions       Abnormal Uterine Bleeding  Abnormal uterine bleeding can affect women at various stages in life, including teenagers, women in their reproductive years, pregnant women, and women who have reached menopause. Several kinds of uterine bleeding are considered abnormal, including:  · Bleeding or spotting between periods.    · Bleeding after sexual intercourse.    · Bleeding that is heavier or more than normal.    · Periods that last longer than usual.  · Bleeding after menopause.    Many cases of abnormal uterine bleeding are minor and simple to treat, while others are more serious. Any type of abnormal bleeding should be evaluated by your health care provider. Treatment will depend on the cause of the bleeding.  HOME CARE INSTRUCTIONS  Monitor your condition for any changes. The following actions may help to alleviate any discomfort you are experiencing:  · Avoid the use of tampons and douches as directed by your health care provider.  · Change your pads frequently.  You should get regular pelvic exams and Pap tests. Keep all follow-up appointments for diagnostic tests as directed by your health care provider.   SEEK MEDICAL CARE IF:   · Your bleeding lasts more than 1 week.    · You feel dizzy at times.    SEEK IMMEDIATE MEDICAL CARE IF:   · You pass out.    · You are changing  pads every 15 to 30 minutes.    · You have abdominal pain.  · You have a fever.    · You become sweaty or weak.    · You are passing large blood clots from the vagina.    · You start to feel nauseous and vomit.  MAKE SURE YOU:   · Understand these instructions.  · Will watch your condition.  · Will get help right away if you are not doing well or get worse.     This information is not intended to replace advice given to you by your health care provider. Make sure you discuss any questions you have with your health care provider.     Document Released: 12/18/2006 Document Revised: 12/23/2014 Document Reviewed: 07/17/2014  [employer] Interactive Patient Education ©2016 [employer] Inc.            Patient Information     Patient Information    Following emergency treatment: all patient requiring follow-up care must return either to a private physician or a clinic if your condition worsens before you are able to obtain further medical attention, please return to the emergency room.     Billing Information    At [facility], we work to make the billing process streamlined for our patients.  Our Representatives are here to answer any questions you may have regarding your hospital bill.  If you have insurance coverage and have supplied your insurance information to us, we will submit a claim to your insurer on your behalf.  Should you have any questions regarding your bill, we can be reached online or by phone as follows:  Online: You are able pay your bills online or live chat with our representatives about any billing questions you may have. We are here to help Monday - Friday from 8:00am to 7:30pm and 9:00am - 12:00pm on Saturdays.  Please visit [email or web address]  for more information.   Phone:  [phone] or [phone]    Please note that your emergency physician, surgeon, pathologist, radiologist, anesthesiologist, and other specialists are not employed by [facility] and will  therefore bill separately for their services.  Please contact them directly for any questions concerning their bills at the numbers below:     Emergency Physician Services:  [phone]  [facility]:  [phone]  [facility]:  [phone]  [facility]:  [phone]    1. Your final bill may vary from the amount quoted upon discharge if all procedures are not complete at that time, or if your doctor has additional procedures of which we are not aware. You will receive an additional bill if you return to the Emergency Department at [facility] for suture removal regardless of the facility of which the sutures were placed.     2. Please arrange for settlement of this account at the emergency registration.    3. All self-pay accounts are due in full at the time of treatment.  If you are unable to meet this obligation then payment is expected within 4-5 days.     4. If you have had radiology studies (CT, X-ray, Ultrasound, MRI), you have received a preliminary result during your emergency department visit. Please contact the radiology department [phone] to receive a copy of your final result. Please discuss the Final result with your primary physician or with the follow up physician provided.     Crisis Hotline:  [place] Crisis Hotline:  0-372-QQVBYDF or 1-302.808.1514  [place] Crisis Hotline:    1-954.203.1014 or 662-294-1225         ED Discharge Follow Up Questions    1. In order to provide you with very good care, we would like to follow up with a phone call in the next few days.  May we have your permission to contact you?     YES /  NO    2. What is the best phone number to call you? (       )_____-__________    3. What is the best time to call you?      Morning  /  Afternoon  /  Evening                   Patient Signature:  ____________________________________________________________    Date:  ____________________________________________________________

## 2017-04-27 NOTE — DISCHARGE INSTRUCTIONS
Abnormal Uterine Bleeding  Abnormal uterine bleeding can affect women at various stages in life, including teenagers, women in their reproductive years, pregnant women, and women who have reached menopause. Several kinds of uterine bleeding are considered abnormal, including:  · Bleeding or spotting between periods.    · Bleeding after sexual intercourse.    · Bleeding that is heavier or more than normal.    · Periods that last longer than usual.  · Bleeding after menopause.    Many cases of abnormal uterine bleeding are minor and simple to treat, while others are more serious. Any type of abnormal bleeding should be evaluated by your health care provider. Treatment will depend on the cause of the bleeding.  HOME CARE INSTRUCTIONS  Monitor your condition for any changes. The following actions may help to alleviate any discomfort you are experiencing:  · Avoid the use of tampons and douches as directed by your health care provider.  · Change your pads frequently.  You should get regular pelvic exams and Pap tests. Keep all follow-up appointments for diagnostic tests as directed by your health care provider.   SEEK MEDICAL CARE IF:   · Your bleeding lasts more than 1 week.    · You feel dizzy at times.    SEEK IMMEDIATE MEDICAL CARE IF:   · You pass out.    · You are changing pads every 15 to 30 minutes.    · You have abdominal pain.  · You have a fever.    · You become sweaty or weak.    · You are passing large blood clots from the vagina.    · You start to feel nauseous and vomit.  MAKE SURE YOU:   · Understand these instructions.  · Will watch your condition.  · Will get help right away if you are not doing well or get worse.     This information is not intended to replace advice given to you by your health care provider. Make sure you discuss any questions you have with your health care provider.     Document Released: 12/18/2006 Document Revised: 12/23/2014 Document Reviewed: 07/17/2014  [employer]  Patient Education ©2016 [employer]